# Patient Record
Sex: MALE | Race: ASIAN | NOT HISPANIC OR LATINO
[De-identification: names, ages, dates, MRNs, and addresses within clinical notes are randomized per-mention and may not be internally consistent; named-entity substitution may affect disease eponyms.]

---

## 2018-09-28 ENCOUNTER — RECORD ABSTRACTING (OUTPATIENT)
Age: 78
End: 2018-09-28

## 2018-09-28 DIAGNOSIS — L57.0 ACTINIC KERATOSIS: ICD-10-CM

## 2018-09-28 DIAGNOSIS — M94.9 DISORDER OF BONE, UNSPECIFIED: ICD-10-CM

## 2018-09-28 DIAGNOSIS — Z80.3 FAMILY HISTORY OF MALIGNANT NEOPLASM OF BREAST: ICD-10-CM

## 2018-09-28 DIAGNOSIS — Z87.11 PERSONAL HISTORY OF PEPTIC ULCER DISEASE: ICD-10-CM

## 2018-09-28 DIAGNOSIS — M89.9 DISORDER OF BONE, UNSPECIFIED: ICD-10-CM

## 2018-12-14 ENCOUNTER — LABORATORY RESULT (OUTPATIENT)
Age: 78
End: 2018-12-14

## 2018-12-14 ENCOUNTER — NON-APPOINTMENT (OUTPATIENT)
Age: 78
End: 2018-12-14

## 2018-12-14 ENCOUNTER — TRANSCRIPTION ENCOUNTER (OUTPATIENT)
Age: 78
End: 2018-12-14

## 2018-12-14 ENCOUNTER — APPOINTMENT (OUTPATIENT)
Dept: PULMONOLOGY | Facility: CLINIC | Age: 78
End: 2018-12-14
Payer: MEDICARE

## 2018-12-14 VITALS
SYSTOLIC BLOOD PRESSURE: 165 MMHG | OXYGEN SATURATION: 97 % | HEIGHT: 67 IN | RESPIRATION RATE: 16 BRPM | DIASTOLIC BLOOD PRESSURE: 87 MMHG | TEMPERATURE: 98 F | WEIGHT: 163 LBS | HEART RATE: 75 BPM | BODY MASS INDEX: 25.58 KG/M2

## 2018-12-14 VITALS — DIASTOLIC BLOOD PRESSURE: 86 MMHG | SYSTOLIC BLOOD PRESSURE: 138 MMHG

## 2018-12-14 DIAGNOSIS — Z11.59 ENCOUNTER FOR SCREENING FOR OTHER VIRAL DISEASES: ICD-10-CM

## 2018-12-14 LAB
ALBUMIN: 80
BILIRUB UR QL STRIP: NEGATIVE
CLARITY UR: CLEAR
COLLECTION METHOD: NORMAL
CREATININE: 200
GLUCOSE UR-MCNC: NEGATIVE
HCG UR QL: 0.2 EU/DL
HGB UR QL STRIP.AUTO: NEGATIVE
KETONES UR-MCNC: NEGATIVE
LEUKOCYTE ESTERASE UR QL STRIP: NEGATIVE
MICROALBUMIN/CREAT UR TEST STR-RTO: NORMAL
NITRITE UR QL STRIP: NEGATIVE
PH UR STRIP: 7
PROT UR STRIP-MCNC: 30
SP GR UR STRIP: 1.02

## 2018-12-14 PROCEDURE — 82044 UR ALBUMIN SEMIQUANTITATIVE: CPT | Mod: QW

## 2018-12-14 PROCEDURE — 81002 URINALYSIS NONAUTO W/O SCOPE: CPT | Mod: 59

## 2018-12-14 PROCEDURE — 36415 COLL VENOUS BLD VENIPUNCTURE: CPT

## 2018-12-14 PROCEDURE — 82570 ASSAY OF URINE CREATININE: CPT | Mod: QW

## 2018-12-14 PROCEDURE — 99214 OFFICE O/P EST MOD 30 MIN: CPT | Mod: 25

## 2018-12-14 PROCEDURE — 93000 ELECTROCARDIOGRAM COMPLETE: CPT

## 2018-12-14 RX ORDER — OMEPRAZOLE 40 MG/1
40 CAPSULE, DELAYED RELEASE ORAL
Refills: 0 | Status: DISCONTINUED | COMMUNITY
End: 2018-12-14

## 2018-12-14 RX ORDER — ALBUTEROL SULFATE 108 UG/1
108 (90 BASE) AEROSOL, METERED RESPIRATORY (INHALATION)
Refills: 0 | Status: DISCONTINUED | COMMUNITY
End: 2018-12-14

## 2018-12-14 RX ORDER — MELOXICAM 15 MG/1
15 TABLET ORAL DAILY
Refills: 0 | Status: DISCONTINUED | COMMUNITY
End: 2018-12-14

## 2018-12-17 LAB
25(OH)D3 SERPL-MCNC: 32.4 NG/ML
ALBUMIN SERPL ELPH-MCNC: 4.6 G/DL
ALP BLD-CCNC: 89 U/L
ALT SERPL-CCNC: 18 U/L
ANION GAP SERPL CALC-SCNC: 12 MMOL/L
AST SERPL-CCNC: 13 U/L
BASOPHILS # BLD AUTO: 0.02 K/UL
BASOPHILS NFR BLD AUTO: 0.5 %
BILIRUB DIRECT SERPL-MCNC: 0.2 MG/DL
BILIRUB INDIRECT SERPL-MCNC: 0.6 MG/DL
BILIRUB SERPL-MCNC: 0.8 MG/DL
BUN SERPL-MCNC: 14 MG/DL
CALCIUM SERPL-MCNC: 9.2 MG/DL
CHLORIDE SERPL-SCNC: 103 MMOL/L
CHOLEST SERPL-MCNC: 177 MG/DL
CHOLEST/HDLC SERPL: 3.1 RATIO
CO2 SERPL-SCNC: 26 MMOL/L
CREAT SERPL-MCNC: 1.12 MG/DL
EOSINOPHIL # BLD AUTO: 0.21 K/UL
EOSINOPHIL NFR BLD AUTO: 5.5 %
GLUCOSE SERPL-MCNC: 95 MG/DL
HBA1C MFR BLD HPLC: 5.4 %
HCT VFR BLD CALC: 42.2 %
HCV AB SER QL: NONREACTIVE
HCV S/CO RATIO: 0.08 S/CO
HDLC SERPL-MCNC: 57 MG/DL
HGB BLD-MCNC: 14.2 G/DL
IMM GRANULOCYTES NFR BLD AUTO: 0.3 %
LDLC SERPL CALC-MCNC: 101 MG/DL
LYMPHOCYTES # BLD AUTO: 1.07 K/UL
LYMPHOCYTES NFR BLD AUTO: 28.2 %
MAN DIFF?: NORMAL
MCHC RBC-ENTMCNC: 28.7 PG
MCHC RBC-ENTMCNC: 33.6 GM/DL
MCV RBC AUTO: 85.3 FL
MONOCYTES # BLD AUTO: 0.18 K/UL
MONOCYTES NFR BLD AUTO: 4.7 %
NEUTROPHILS # BLD AUTO: 2.3 K/UL
NEUTROPHILS NFR BLD AUTO: 60.8 %
PLATELET # BLD AUTO: 211 K/UL
POTASSIUM SERPL-SCNC: 4.7 MMOL/L
PROT SERPL-MCNC: 7.3 G/DL
PSA SERPL-MCNC: 4.45 NG/ML
RBC # BLD: 4.95 M/UL
RBC # FLD: 13.8 %
SODIUM SERPL-SCNC: 141 MMOL/L
T3 SERPL-MCNC: 145 NG/DL
T3RU NFR SERPL: 0.98 INDEX
T4 FREE SERPL-MCNC: 1.5 NG/DL
T4 SERPL-MCNC: 8.9 UG/DL
TRIGL SERPL-MCNC: 94 MG/DL
TSH SERPL-ACNC: 1.4 UIU/ML
WBC # FLD AUTO: 3.79 K/UL

## 2018-12-27 ENCOUNTER — APPOINTMENT (OUTPATIENT)
Dept: CV DIAGNOSITCS | Facility: HOSPITAL | Age: 78
End: 2018-12-27
Payer: MEDICARE

## 2018-12-27 ENCOUNTER — OUTPATIENT (OUTPATIENT)
Dept: OUTPATIENT SERVICES | Facility: HOSPITAL | Age: 78
LOS: 1 days | End: 2018-12-27

## 2018-12-27 DIAGNOSIS — J45.909 UNSPECIFIED ASTHMA, UNCOMPLICATED: ICD-10-CM

## 2018-12-27 PROCEDURE — 93306 TTE W/DOPPLER COMPLETE: CPT | Mod: 26

## 2019-01-07 DIAGNOSIS — R19.5 OTHER FECAL ABNORMALITIES: ICD-10-CM

## 2019-01-16 PROBLEM — R19.5 POSITIVE COLORECTAL CANCER SCREENING USING COLOGUARD TEST: Status: ACTIVE | Noted: 2019-01-16

## 2019-01-28 ENCOUNTER — MEDICATION RENEWAL (OUTPATIENT)
Age: 79
End: 2019-01-28

## 2019-01-28 RX ORDER — DILTIAZEM HYDROCHLORIDE 180 MG/1
180 CAPSULE, EXTENDED RELEASE ORAL
Qty: 30 | Refills: 5 | Status: DISCONTINUED | COMMUNITY
Start: 2019-01-22 | End: 2019-01-28

## 2019-02-26 ENCOUNTER — APPOINTMENT (OUTPATIENT)
Dept: PULMONOLOGY | Facility: CLINIC | Age: 79
End: 2019-02-26
Payer: MEDICARE

## 2019-02-26 VITALS
BODY MASS INDEX: 25.58 KG/M2 | WEIGHT: 163 LBS | HEIGHT: 67 IN | SYSTOLIC BLOOD PRESSURE: 138 MMHG | OXYGEN SATURATION: 98 % | DIASTOLIC BLOOD PRESSURE: 85 MMHG | HEART RATE: 65 BPM

## 2019-02-26 PROCEDURE — 99213 OFFICE O/P EST LOW 20 MIN: CPT

## 2019-02-26 NOTE — PHYSICAL EXAM
[General Appearance - Well Developed] : well developed [General Appearance - Well Nourished] : well nourished [Jugular Venous Distention Increased] : there was no jugular-venous distention [Thyroid Diffuse Enlargement] : the thyroid was not enlarged [Thyroid Nodule] : there were no palpable thyroid nodules [Heart Sounds] : normal S1 and S2 [Murmurs] : no murmurs present [Edema] : no peripheral edema present [Auscultation Breath Sounds / Voice Sounds] : lungs were clear to auscultation bilaterally [Lungs Percussion] : the lungs were normal to percussion [Abdomen Soft] : soft [Abdomen Tenderness] : non-tender [] : no hepato-splenomegaly [Abdomen Mass (___ Cm)] : no abdominal mass palpated [Abnormal Walk] : normal gait [Gait - Sufficient For Exercise Testing] : the gait was sufficient for exercise testing [Nail Clubbing] : no clubbing of the fingernails [Cyanosis, Localized] : no localized cyanosis [Deep Tendon Reflexes (DTR)] : deep tendon reflexes were 2+ and symmetric [Sensation] : the sensory exam was normal to light touch and pinprick [No Focal Deficits] : no focal deficits [Oriented To Time, Place, And Person] : oriented to person, place, and time [Impaired Insight] : insight and judgment were intact [Affect] : the affect was normal [FreeTextEntry1] : Rectal neg.

## 2019-03-02 ENCOUNTER — TRANSCRIPTION ENCOUNTER (OUTPATIENT)
Age: 79
End: 2019-03-02

## 2019-03-18 ENCOUNTER — RX RENEWAL (OUTPATIENT)
Age: 79
End: 2019-03-18

## 2019-06-11 ENCOUNTER — APPOINTMENT (OUTPATIENT)
Dept: PULMONOLOGY | Facility: CLINIC | Age: 79
End: 2019-06-11
Payer: MEDICARE

## 2019-06-11 VITALS
TEMPERATURE: 100.2 F | SYSTOLIC BLOOD PRESSURE: 114 MMHG | HEART RATE: 95 BPM | OXYGEN SATURATION: 95 % | DIASTOLIC BLOOD PRESSURE: 71 MMHG

## 2019-06-11 DIAGNOSIS — I51.7 CARDIOMEGALY: ICD-10-CM

## 2019-06-11 DIAGNOSIS — M54.9 DORSALGIA, UNSPECIFIED: ICD-10-CM

## 2019-06-11 DIAGNOSIS — R50.9 FEVER, UNSPECIFIED: ICD-10-CM

## 2019-06-11 LAB
BILIRUB UR QL STRIP: NEGATIVE
CLARITY UR: CLEAR
COLLECTION METHOD: NORMAL
GLUCOSE UR-MCNC: NEGATIVE
HCG UR QL: 0.2 EU/DL
HGB UR QL STRIP.AUTO: NEGATIVE
KETONES UR-MCNC: NEGATIVE
LEUKOCYTE ESTERASE UR QL STRIP: NEGATIVE
NITRITE UR QL STRIP: NEGATIVE
PH UR STRIP: 5.5
PROT UR STRIP-MCNC: NEGATIVE
SP GR UR STRIP: 1.02

## 2019-06-11 PROCEDURE — 36415 COLL VENOUS BLD VENIPUNCTURE: CPT

## 2019-06-11 PROCEDURE — 81003 URINALYSIS AUTO W/O SCOPE: CPT | Mod: QW

## 2019-06-11 PROCEDURE — 99213 OFFICE O/P EST LOW 20 MIN: CPT | Mod: 25

## 2019-06-11 RX ORDER — IRBESARTAN 150 MG/1
150 TABLET ORAL
Qty: 90 | Refills: 2 | Status: DISCONTINUED | COMMUNITY
Start: 2019-01-28 | End: 2019-06-11

## 2019-06-11 RX ORDER — SODIUM SULFATE, POTASSIUM SULFATE, MAGNESIUM SULFATE 17.5; 3.13; 1.6 G/ML; G/ML; G/ML
17.5-3.13-1.6 SOLUTION, CONCENTRATE ORAL
Qty: 354 | Refills: 0 | Status: DISCONTINUED | COMMUNITY
Start: 2019-03-26

## 2019-06-11 NOTE — REASON FOR VISIT
CONSTITUTIONAL: No fever, weight loss, or fatigue  EYES: No eye pain, visual disturbances, or discharge  ENMT:  No difficulty hearing, tinnitus, vertigo; No sinus or throat pain  NECK: No pain or stiffness  BREASTS: No pain, masses, or nipple discharge  RESPIRATORY: No cough, wheezing, chills or hemoptysis; No shortness of breath  CARDIOVASCULAR: No chest pain, palpitations, dizziness, or leg swelling  GASTROINTESTINAL: No abdominal or epigastric pain. No nausea, vomiting, or hematemesis; No diarrhea or constipation. No melena or hematochezia.  GENITOURINARY: No dysuria, frequency, hematuria, or incontinence  NEUROLOGICAL: No headaches, memory loss, loss of strength, numbness, or tremors  SKIN: No itching, burning, rashes, or lesions   LYMPH NODES: No enlarged glands  ENDOCRINE: No heat or cold intolerance; No hair loss  MUSCULOSKELETAL: left leg swelling; No muscle, back, or extremity pain  PSYCHIATRIC: No depression, anxiety, mood swings, or difficulty sleeping  HEME/LYMPH: No easy bruising, or bleeding gums  ALLERGY AND IMMUNOLOGIC: No hives or eczema [Follow-Up] : a follow-up visit [FreeTextEntry2] : hypertension

## 2019-06-11 NOTE — PHYSICAL EXAM
[General Appearance - Well Developed] : well developed [General Appearance - Well Nourished] : well nourished [Jugular Venous Distention Increased] : there was no jugular-venous distention [Thyroid Diffuse Enlargement] : the thyroid was not enlarged [Thyroid Nodule] : there were no palpable thyroid nodules [Heart Sounds] : normal S1 and S2 [Edema] : no peripheral edema present [Murmurs] : no murmurs present [Auscultation Breath Sounds / Voice Sounds] : lungs were clear to auscultation bilaterally [Lungs Percussion] : the lungs were normal to percussion [Abdomen Soft] : soft [Abdomen Tenderness] : non-tender [] : no hepato-splenomegaly [Abdomen Mass (___ Cm)] : no abdominal mass palpated [Abnormal Walk] : normal gait [Gait - Sufficient For Exercise Testing] : the gait was sufficient for exercise testing [Nail Clubbing] : no clubbing of the fingernails [Cyanosis, Localized] : no localized cyanosis [Deep Tendon Reflexes (DTR)] : deep tendon reflexes were 2+ and symmetric [Sensation] : the sensory exam was normal to light touch and pinprick [No Focal Deficits] : no focal deficits [Oriented To Time, Place, And Person] : oriented to person, place, and time [Impaired Insight] : insight and judgment were intact [Affect] : the affect was normal [FreeTextEntry1] : Rectal neg.

## 2019-06-11 NOTE — HISTORY OF PRESENT ILLNESS
[FreeTextEntry1] : Had recent colonoscopy because of positive cologard, it was clear no polyps \par \par past day has not felt well and has some minor back discomfort

## 2019-06-12 LAB
BASOPHILS # BLD AUTO: 0.02 K/UL
BASOPHILS NFR BLD AUTO: 0.4 %
EOSINOPHIL # BLD AUTO: 0.05 K/UL
EOSINOPHIL NFR BLD AUTO: 1 %
HCT VFR BLD CALC: 44.9 %
HGB BLD-MCNC: 14.9 G/DL
IMM GRANULOCYTES NFR BLD AUTO: 0.4 %
LYMPHOCYTES # BLD AUTO: 0.23 K/UL
LYMPHOCYTES NFR BLD AUTO: 4.4 %
MAN DIFF?: NORMAL
MCHC RBC-ENTMCNC: 29.7 PG
MCHC RBC-ENTMCNC: 33.2 GM/DL
MCV RBC AUTO: 89.4 FL
MONOCYTES # BLD AUTO: 0.22 K/UL
MONOCYTES NFR BLD AUTO: 4.2 %
NEUTROPHILS # BLD AUTO: 4.72 K/UL
NEUTROPHILS NFR BLD AUTO: 89.6 %
PLATELET # BLD AUTO: 173 K/UL
RBC # BLD: 5.02 M/UL
RBC # FLD: 13.5 %
WBC # FLD AUTO: 5.26 K/UL

## 2019-10-08 ENCOUNTER — APPOINTMENT (OUTPATIENT)
Dept: PULMONOLOGY | Facility: CLINIC | Age: 79
End: 2019-10-08

## 2020-04-17 ENCOUNTER — APPOINTMENT (OUTPATIENT)
Dept: PULMONOLOGY | Facility: CLINIC | Age: 80
End: 2020-04-17

## 2020-04-20 ENCOUNTER — TRANSCRIPTION ENCOUNTER (OUTPATIENT)
Age: 80
End: 2020-04-20

## 2020-05-22 ENCOUNTER — APPOINTMENT (OUTPATIENT)
Dept: PULMONOLOGY | Facility: CLINIC | Age: 80
End: 2020-05-22
Payer: MEDICARE

## 2020-05-22 VITALS
SYSTOLIC BLOOD PRESSURE: 151 MMHG | DIASTOLIC BLOOD PRESSURE: 82 MMHG | RESPIRATION RATE: 16 BRPM | HEART RATE: 80 BPM | OXYGEN SATURATION: 96 % | TEMPERATURE: 98.6 F

## 2020-05-22 DIAGNOSIS — M79.89 OTHER SPECIFIED SOFT TISSUE DISORDERS: ICD-10-CM

## 2020-05-22 PROCEDURE — 99213 OFFICE O/P EST LOW 20 MIN: CPT | Mod: 25

## 2020-05-22 PROCEDURE — 36415 COLL VENOUS BLD VENIPUNCTURE: CPT

## 2020-05-23 NOTE — HISTORY OF PRESENT ILLNESS
[TextBox_4] : Patient with 1 day history of some swelling behind the left knee.  Believes leg may be swollen but has improved.  No definitive trauma.  No significant travel.  No edema.

## 2020-05-23 NOTE — PHYSICAL EXAM
[No Acute Distress] : no acute distress [Normal S1, S2] : normal s1, s2 [Normal Oropharynx] : normal oropharynx [Clear to Auscultation Bilaterally] : clear to auscultation bilaterally [Benign] : benign [Normal to Percussion] : normal to percussion [No Clubbing] : no clubbing [No Edema] : no edema [No Cyanosis] : no cyanosis [TextBox_105] : Mild swelling of left knee.  Mild swelling behind left knee.

## 2020-05-26 ENCOUNTER — TRANSCRIPTION ENCOUNTER (OUTPATIENT)
Age: 80
End: 2020-05-26

## 2020-05-26 LAB — DEPRECATED D DIMER PPP IA-ACNC: <150 NG/ML DDU

## 2020-06-05 ENCOUNTER — TRANSCRIPTION ENCOUNTER (OUTPATIENT)
Age: 80
End: 2020-06-05

## 2020-06-08 ENCOUNTER — APPOINTMENT (OUTPATIENT)
Dept: ORTHOPEDIC SURGERY | Facility: CLINIC | Age: 80
End: 2020-06-08
Payer: MEDICARE

## 2020-06-08 DIAGNOSIS — M47.816 SPONDYLOSIS W/OUT MYELOPATHY OR RADICULOPATHY, LUMBAR REGION: ICD-10-CM

## 2020-06-08 DIAGNOSIS — M17.10 UNILATERAL PRIMARY OSTEOARTHRITIS, UNSPECIFIED KNEE: ICD-10-CM

## 2020-06-08 DIAGNOSIS — M48.061 SPINAL STENOSIS, LUMBAR REGION WITHOUT NEUROGENIC CLAUDICATION: ICD-10-CM

## 2020-06-08 PROCEDURE — 73562 X-RAY EXAM OF KNEE 3: CPT | Mod: 50

## 2020-06-08 PROCEDURE — 72100 X-RAY EXAM L-S SPINE 2/3 VWS: CPT

## 2020-06-08 PROCEDURE — 99214 OFFICE O/P EST MOD 30 MIN: CPT

## 2020-12-04 ENCOUNTER — TRANSCRIPTION ENCOUNTER (OUTPATIENT)
Age: 80
End: 2020-12-04

## 2021-05-11 ENCOUNTER — TRANSCRIPTION ENCOUNTER (OUTPATIENT)
Age: 81
End: 2021-05-11

## 2021-05-17 ENCOUNTER — RX RENEWAL (OUTPATIENT)
Age: 81
End: 2021-05-17

## 2021-05-17 ENCOUNTER — TRANSCRIPTION ENCOUNTER (OUTPATIENT)
Age: 81
End: 2021-05-17

## 2021-07-11 ENCOUNTER — NON-APPOINTMENT (OUTPATIENT)
Age: 81
End: 2021-07-11

## 2021-07-16 ENCOUNTER — NON-APPOINTMENT (OUTPATIENT)
Age: 81
End: 2021-07-16

## 2021-07-16 ENCOUNTER — LABORATORY RESULT (OUTPATIENT)
Age: 81
End: 2021-07-16

## 2021-07-16 ENCOUNTER — APPOINTMENT (OUTPATIENT)
Dept: PULMONOLOGY | Facility: CLINIC | Age: 81
End: 2021-07-16
Payer: MEDICARE

## 2021-07-16 VITALS
SYSTOLIC BLOOD PRESSURE: 143 MMHG | OXYGEN SATURATION: 96 % | TEMPERATURE: 97.6 F | DIASTOLIC BLOOD PRESSURE: 78 MMHG | HEART RATE: 80 BPM

## 2021-07-16 DIAGNOSIS — I06.0 RHEUMATIC AORTIC STENOSIS: ICD-10-CM

## 2021-07-16 DIAGNOSIS — Z23 ENCOUNTER FOR IMMUNIZATION: ICD-10-CM

## 2021-07-16 LAB
ALBUMIN: 150
BILIRUB UR QL STRIP: NORMAL
CLARITY UR: CLEAR
COLLECTION METHOD: NORMAL
CREATININE: 200
GLUCOSE UR-MCNC: NORMAL
HCG UR QL: 0.2 EU/DL
HGB UR QL STRIP.AUTO: NORMAL
KETONES UR-MCNC: NORMAL
LEUKOCYTE ESTERASE UR QL STRIP: NORMAL
MICROALBUMIN/CREAT UR TEST STR-RTO: NORMAL
NITRITE UR QL STRIP: NORMAL
PH UR STRIP: 6.5
PROT UR STRIP-MCNC: 100
SP GR UR STRIP: 1.02

## 2021-07-16 PROCEDURE — 36415 COLL VENOUS BLD VENIPUNCTURE: CPT

## 2021-07-16 PROCEDURE — 81003 URINALYSIS AUTO W/O SCOPE: CPT | Mod: QW

## 2021-07-16 PROCEDURE — 82044 UR ALBUMIN SEMIQUANTITATIVE: CPT | Mod: QW

## 2021-07-16 PROCEDURE — 99214 OFFICE O/P EST MOD 30 MIN: CPT | Mod: 25

## 2021-07-16 PROCEDURE — 93000 ELECTROCARDIOGRAM COMPLETE: CPT

## 2021-07-16 NOTE — PROCEDURE
[FreeTextEntry1] : Electrocardiogram reveals normal sinus rhythm without significant change from prior.

## 2021-07-16 NOTE — ASSESSMENT
[FreeTextEntry1] : Medications reviewed and renewed.\par Labs drawn in office today\par Trial of Mycostatin powder.  If no response dermatology evaluation.

## 2021-07-16 NOTE — PHYSICAL EXAM
[No Focal Deficits] : no focal deficits [Oriented x3] : oriented x3 [No Acute Distress] : no acute distress [Supple] : supple [No JVD] : no jvd [Normal S1, S2] : normal s1, s2 [No Murmurs] : no murmurs [Clear to Auscultation Bilaterally] : clear to auscultation bilaterally [Normal to Percussion] : normal to percussion [No Abnormalities] : no abnormalities [Benign] : benign [Not Tender] : not tender [No HSM] : no hsm [No Clubbing] : no clubbing [No Cyanosis] : no cyanosis [No Edema] : no edema [TextBox_89] : Perianal rash. [TextBox_125] : Perianal rash

## 2021-07-16 NOTE — DISCUSSION/SUMMARY
[FreeTextEntry1] : Blood pressure controlled on present regimen.\par Recommendations include continuing present medications.\par Low-salt diet\par Exercise\par Renal rash most likely fungal.\par Abnormal EKG without change.  Work-up in past.\par

## 2021-07-16 NOTE — HISTORY OF PRESENT ILLNESS
[TextBox_4] : Colonoscopy  Y\par Optho  Y\par Derm Y\par \par \par Feeling well\par Does have some perianal pruritis.  Tried cortisone cream without response.\par \par No significant cough, wheezing, chest pain or SOB.\par

## 2021-07-19 LAB
25(OH)D3 SERPL-MCNC: 43.3 NG/ML
ALBUMIN SERPL ELPH-MCNC: 4.7 G/DL
ALP BLD-CCNC: 89 U/L
ALT SERPL-CCNC: 10 U/L
ANION GAP SERPL CALC-SCNC: 13 MMOL/L
AST SERPL-CCNC: 13 U/L
BASOPHILS # BLD AUTO: 0.03 K/UL
BASOPHILS NFR BLD AUTO: 0.8 %
BILIRUB DIRECT SERPL-MCNC: 0.2 MG/DL
BILIRUB INDIRECT SERPL-MCNC: 0.6 MG/DL
BILIRUB SERPL-MCNC: 0.8 MG/DL
BUN SERPL-MCNC: 18 MG/DL
CALCIUM SERPL-MCNC: 9.9 MG/DL
CHLORIDE SERPL-SCNC: 103 MMOL/L
CHOLEST SERPL-MCNC: 176 MG/DL
CO2 SERPL-SCNC: 25 MMOL/L
COVID-19 NUCLEOCAPSID  GAM ANTIBODY INTERPRETATION: NEGATIVE
CREAT SERPL-MCNC: 1.27 MG/DL
EOSINOPHIL # BLD AUTO: 0.25 K/UL
EOSINOPHIL NFR BLD AUTO: 6.4 %
ESTIMATED AVERAGE GLUCOSE: 108 MG/DL
GLUCOSE SERPL-MCNC: 95 MG/DL
HBA1C MFR BLD HPLC: 5.4 %
HCT VFR BLD CALC: 44 %
HDLC SERPL-MCNC: 56 MG/DL
HGB BLD-MCNC: 14.5 G/DL
IMM GRANULOCYTES NFR BLD AUTO: 0.3 %
LDLC SERPL CALC-MCNC: 101 MG/DL
LYMPHOCYTES # BLD AUTO: 1.09 K/UL
LYMPHOCYTES NFR BLD AUTO: 27.7 %
MAN DIFF?: NORMAL
MCHC RBC-ENTMCNC: 30.1 PG
MCHC RBC-ENTMCNC: 33 GM/DL
MCV RBC AUTO: 91.5 FL
MONOCYTES # BLD AUTO: 0.35 K/UL
MONOCYTES NFR BLD AUTO: 8.9 %
NEUTROPHILS # BLD AUTO: 2.2 K/UL
NEUTROPHILS NFR BLD AUTO: 55.9 %
NONHDLC SERPL-MCNC: 120 MG/DL
PLATELET # BLD AUTO: 184 K/UL
POTASSIUM SERPL-SCNC: 5 MMOL/L
PROT SERPL-MCNC: 7.2 G/DL
RBC # BLD: 4.81 M/UL
RBC # FLD: 13.6 %
SARS-COV-2 AB SERPL QL IA: 0.1 INDEX
SODIUM SERPL-SCNC: 142 MMOL/L
T3 SERPL-MCNC: 120 NG/DL
T3RU NFR SERPL: 0.9 TBI
T4 FREE SERPL-MCNC: 1.3 NG/DL
T4 SERPL-MCNC: 7.7 UG/DL
TRIGL SERPL-MCNC: 97 MG/DL
TSH SERPL-ACNC: 1.05 UIU/ML
WBC # FLD AUTO: 3.93 K/UL

## 2021-10-22 ENCOUNTER — APPOINTMENT (OUTPATIENT)
Dept: PULMONOLOGY | Facility: CLINIC | Age: 81
End: 2021-10-22

## 2021-11-21 ENCOUNTER — RX RENEWAL (OUTPATIENT)
Age: 81
End: 2021-11-21

## 2022-05-09 ENCOUNTER — RX RENEWAL (OUTPATIENT)
Age: 82
End: 2022-05-09

## 2022-09-09 ENCOUNTER — NON-APPOINTMENT (OUTPATIENT)
Age: 82
End: 2022-09-09

## 2022-09-27 ENCOUNTER — LABORATORY RESULT (OUTPATIENT)
Age: 82
End: 2022-09-27

## 2022-09-27 ENCOUNTER — NON-APPOINTMENT (OUTPATIENT)
Age: 82
End: 2022-09-27

## 2022-09-27 ENCOUNTER — APPOINTMENT (OUTPATIENT)
Dept: PULMONOLOGY | Facility: CLINIC | Age: 82
End: 2022-09-27

## 2022-09-27 VITALS
WEIGHT: 155 LBS | DIASTOLIC BLOOD PRESSURE: 76 MMHG | HEART RATE: 83 BPM | SYSTOLIC BLOOD PRESSURE: 133 MMHG | BODY MASS INDEX: 24.28 KG/M2 | OXYGEN SATURATION: 96 %

## 2022-09-27 DIAGNOSIS — I51.9 HEART DISEASE, UNSPECIFIED: ICD-10-CM

## 2022-09-27 DIAGNOSIS — Z00.00 ENCOUNTER FOR GENERAL ADULT MEDICAL EXAMINATION W/OUT ABNORMAL FINDINGS: ICD-10-CM

## 2022-09-27 DIAGNOSIS — R79.89 OTHER SPECIFIED ABNORMAL FINDINGS OF BLOOD CHEMISTRY: ICD-10-CM

## 2022-09-27 DIAGNOSIS — H26.9 UNSPECIFIED CATARACT: ICD-10-CM

## 2022-09-27 DIAGNOSIS — J45.909 UNSPECIFIED ASTHMA, UNCOMPLICATED: ICD-10-CM

## 2022-09-27 DIAGNOSIS — R97.20 ELEVATED PROSTATE, SPECIFIC ANTIGEN [PSA]: ICD-10-CM

## 2022-09-27 LAB
ALBUMIN: NORMAL
BILIRUB UR QL STRIP: NORMAL
CLARITY UR: CLEAR
COLLECTION METHOD: NORMAL
CREATININE: NORMAL
GLUCOSE UR-MCNC: NORMAL
HCG UR QL: 0.2 EU/DL
HGB UR QL STRIP.AUTO: NORMAL
KETONES UR-MCNC: NORMAL
LEUKOCYTE ESTERASE UR QL STRIP: NORMAL
MICROALBUMIN/CREAT UR TEST STR-RTO: NORMAL
NITRITE UR QL STRIP: NORMAL
PH UR STRIP: 5.5
PROT UR STRIP-MCNC: NORMAL
SP GR UR STRIP: 1.02

## 2022-09-27 PROCEDURE — 99214 OFFICE O/P EST MOD 30 MIN: CPT | Mod: 25

## 2022-09-27 PROCEDURE — 81003 URINALYSIS AUTO W/O SCOPE: CPT | Mod: QW

## 2022-09-27 PROCEDURE — 82044 UR ALBUMIN SEMIQUANTITATIVE: CPT | Mod: QW

## 2022-09-27 PROCEDURE — 93000 ELECTROCARDIOGRAM COMPLETE: CPT

## 2022-09-27 PROCEDURE — 36415 COLL VENOUS BLD VENIPUNCTURE: CPT

## 2022-09-27 RX ORDER — NYSTATIN 100000 1/G
100000 POWDER TOPICAL
Qty: 1 | Refills: 3 | Status: DISCONTINUED | COMMUNITY
Start: 2021-07-16 | End: 2022-09-27

## 2022-09-29 ENCOUNTER — TRANSCRIPTION ENCOUNTER (OUTPATIENT)
Age: 82
End: 2022-09-29

## 2022-09-29 NOTE — ASSESSMENT
[FreeTextEntry1] : Cardiology follow-up.\par Continues therapy.\par Labs sent.\par Consider neurology evaluation if leg weakness persist.\par \par Medical problems as above. Medical status maximized. No medical contraindications to surgery.\par

## 2022-09-29 NOTE — PHYSICAL EXAM
[No Acute Distress] : no acute distress [Supple] : supple [No JVD] : no jvd [Normal S1, S2] : normal s1, s2 [Clear to Auscultation Bilaterally] : clear to auscultation bilaterally [Normal to Percussion] : normal to percussion [No Abnormalities] : no abnormalities [Benign] : benign [Not Tender] : not tender [No HSM] : no hsm [No Clubbing] : no clubbing [No Cyanosis] : no cyanosis [No Edema] : no edema [No Focal Deficits] : no focal deficits [Oriented x3] : oriented x3 [Murmur ___ / 6] : murmur [unfilled] / 6 [No Sensory Deficits] : no sensory deficits [No Motor Deficits] : no motor deficits [TextBox_89] : Perianal rash. [TextBox_125] : Perianal rash

## 2022-09-29 NOTE — HISTORY OF PRESENT ILLNESS
[TextBox_4] : \par Going for left eye cataract with Dr Lana Long Fax [phone]\par \par Colonoscopy  Y 4 years ago\par Optho  Y\par Derm Y 2 years ago\par \par \par Feeling well\par Got COVID booster last week wants to wait on flu shot\par has not seen cardio in years\par \par No significant cough, wheezing, chest pain or SOB.\par Feels legs are getting weaker. \par

## 2022-09-29 NOTE — DISCUSSION/SUMMARY
[FreeTextEntry1] : Blood pressure controlled on present regimen.\par Recommendations include continuing present medications.\par Low-salt diet\par Exercise\par Abnormal EKG without change.  Work-up in past.\par Mild leg weakness. Normal exam

## 2022-09-30 LAB
25(OH)D3 SERPL-MCNC: 45.3 NG/ML
ANION GAP SERPL CALC-SCNC: 14 MMOL/L
BASOPHILS # BLD AUTO: 0.04 K/UL
BASOPHILS NFR BLD AUTO: 1 %
BUN SERPL-MCNC: 21 MG/DL
CALCIUM SERPL-MCNC: 9.7 MG/DL
CHLORIDE SERPL-SCNC: 103 MMOL/L
CHOLEST SERPL-MCNC: 192 MG/DL
CO2 SERPL-SCNC: 24 MMOL/L
CREAT SERPL-MCNC: 1.29 MG/DL
EGFR: 55 ML/MIN/1.73M2
EOSINOPHIL # BLD AUTO: 0.31 K/UL
EOSINOPHIL NFR BLD AUTO: 7.8 %
ESTIMATED AVERAGE GLUCOSE: 111 MG/DL
GLUCOSE SERPL-MCNC: 94 MG/DL
HBA1C MFR BLD HPLC: 5.5 %
HCT VFR BLD CALC: 43.3 %
HCV AB SER QL: NONREACTIVE
HCV S/CO RATIO: 0.13 S/CO
HDLC SERPL-MCNC: 61 MG/DL
HGB BLD-MCNC: 14.2 G/DL
IMM GRANULOCYTES NFR BLD AUTO: 0.3 %
LDLC SERPL CALC-MCNC: 114 MG/DL
LYMPHOCYTES # BLD AUTO: 1.06 K/UL
LYMPHOCYTES NFR BLD AUTO: 26.5 %
MAN DIFF?: NORMAL
MCHC RBC-ENTMCNC: 29.7 PG
MCHC RBC-ENTMCNC: 32.8 GM/DL
MCV RBC AUTO: 90.6 FL
MONOCYTES # BLD AUTO: 0.34 K/UL
MONOCYTES NFR BLD AUTO: 8.5 %
NEUTROPHILS # BLD AUTO: 2.24 K/UL
NEUTROPHILS NFR BLD AUTO: 55.9 %
NONHDLC SERPL-MCNC: 131 MG/DL
PLATELET # BLD AUTO: 201 K/UL
POTASSIUM SERPL-SCNC: 4.3 MMOL/L
RBC # BLD: 4.78 M/UL
RBC # FLD: 14 %
SODIUM SERPL-SCNC: 141 MMOL/L
T3 SERPL-MCNC: 132 NG/DL
T3RU NFR SERPL: 0.9 TBI
T4 FREE SERPL-MCNC: 1.5 NG/DL
T4 SERPL-MCNC: 8.8 UG/DL
TRIGL SERPL-MCNC: 87 MG/DL
TSH SERPL-ACNC: 0.84 UIU/ML
WBC # FLD AUTO: 4 K/UL

## 2022-10-31 ENCOUNTER — TRANSCRIPTION ENCOUNTER (OUTPATIENT)
Age: 82
End: 2022-10-31

## 2022-11-04 ENCOUNTER — APPOINTMENT (OUTPATIENT)
Dept: PULMONOLOGY | Facility: CLINIC | Age: 82
End: 2022-11-04

## 2022-11-04 VITALS — HEART RATE: 100 BPM | SYSTOLIC BLOOD PRESSURE: 153 MMHG | OXYGEN SATURATION: 97 % | DIASTOLIC BLOOD PRESSURE: 77 MMHG

## 2022-11-04 VITALS — DIASTOLIC BLOOD PRESSURE: 74 MMHG | SYSTOLIC BLOOD PRESSURE: 124 MMHG

## 2022-11-04 PROCEDURE — 99213 OFFICE O/P EST LOW 20 MIN: CPT

## 2022-11-04 NOTE — PHYSICAL EXAM
[No Acute Distress] : no acute distress [Supple] : supple [No JVD] : no jvd [Murmur ___ / 6] : murmur [unfilled] / 6 [Normal S1, S2] : normal s1, s2 [Clear to Auscultation Bilaterally] : clear to auscultation bilaterally [Normal to Percussion] : normal to percussion [No Abnormalities] : no abnormalities [Benign] : benign [Not Tender] : not tender [No HSM] : no hsm [No Clubbing] : no clubbing [No Cyanosis] : no cyanosis [No Edema] : no edema [No Focal Deficits] : no focal deficits [No Sensory Deficits] : no sensory deficits [No Motor Deficits] : no motor deficits [Oriented x3] : oriented x3 [TextBox_89] : Perianal rash. [TextBox_125] : Perianal rash

## 2022-11-04 NOTE — DISCUSSION/SUMMARY
[FreeTextEntry1] : Blood pressure controlled on present regimen.\par Recommendations include continuing present medications.\par Low-salt diet\par Exercise\par Abnormal EKG without change.  Work-up in past. Will repeat echo.\par Mild leg weakness. Normal exam

## 2022-11-04 NOTE — HISTORY OF PRESENT ILLNESS
[TextBox_4] : \par Going for left eye cataract with Dr Lana Long Fax [phone]\par \par \par Feeling well\par No significant cough, wheezing, chest pain or SOB.\par Feels legs are getting weaker. \par \par States had cardiac evaluation 3 years ago told fine.

## 2022-11-04 NOTE — PROCEDURE
[FreeTextEntry1] : 9/27/22 \par Electrocardiogram reveals normal sinus rhythm without significant change from prior.\par \par Patient name: LNIDY LOVE\par YOB: 1940   Age: 78 (M)   MR#: [id number]\par Study Date: 12/27/2018\par Location: O/PSonographer: Robin Tripathi\par Study quality: Technically good\par Referring Physician: Armand Bradley MD\par Blood Pressure: 170/99 mmHg\par Height: 170 cm\par Weight: 72 kg\par BSA: 1.8 m2\par ------------------------------------------------------------------------\par PROCEDURE: Transthoracic echocardiogram with 2-D, M-Mode\par and complete spectral and color flow Doppler.\par INDICATION: Abnormal electrocardiogram (ECG) (EKG) (R94.31)\par ------------------------------------------------------------------------\par DIMENSIONS:\par Dimensions:     Normal Values:\par LA:     3.9 cm    2.0 - 4.0 cm\par Ao:     3.6 cm    2.0 - 3.8 cm\par SEPTUM: 1.6 cm    0.6 - 1.2 cm\par PWT:    1.5 cm    0.6 - 1.1 cm\par LVIDd:  5.0 cm    3.0 - 5.6 cm\par LVIDs:  3.2 cm    1.8 - 4.0 cm\par Derived Variables:\par LVMI: 185 g/m2\par RWT: 0.60\par Fractional short: 36 %\par Ejection Fraction (Teicholtz): 65 %\par ------------------------------------------------------------------------\par OBSERVATIONS:\par Mitral Valve: Mitral annular calcification, otherwise\par normal mitral valve. Minimal mitral regurgitation.\par Aortic Root: Normal size aortic root. (Ao:3.6 cm).\par Aortic Valve: Calcified trileaflet aortic valve with normal\par opening.\par Left Atrium: Normal left atrium.  LA volume index = 24\par cc/m2.\par Left Ventricle: Normal left ventricular systolic function.\par No segmental wall motion abnormalities. The distal and\par apical endocardium are particularly hypertrophied. Severe\par concentric left ventricular hypertrophy.\par Right Heart: Normal right atrium. Normal right ventricular\par size and function. The right ventricular endocardium is\par hypertrophied.  Normal tricuspid valve. Mild tricuspid\par regurgitation. Normal pulmonic valve. Minimal pulmonic\par regurgitation.\par Pericardium/PleuraNormal pericardium with no pericardial\par effusion.\par Hemodynamic: Estimated right ventricular systolic pressure\par equals 45 mm Hg, assuming right atrial pressure equals 10\par mm Hg, consistent with mild pulmonary hypertension.\par ------------------------------------------------------------------------\par CONCLUSIONS:\par 1. Calcified trileaflet aortic valve with normal opening.\par 2. Severe concentric left ventricular hypertrophy.\par 3. Normal left ventricular systolic function. No segmental\par wall motion abnormalities. The distal and apical\par endocardium are particularly hypertrophied.\par 4. Normal right ventricular size and function. The right\par ventricular endocardium is hypertrophied. \par 5. Estimated pulmonary artery systolic pressure equals 45\par mm Hg, assuming right atrial pressure equals 10  mm Hg,\par consistent with mild pulmonary hypertension.\par ------------------------------------------------------------------------Michaelpar Confirmed on  12/27/2018 - 11:36:28 by Kalee Murphy,ghassan WAGONER

## 2022-11-04 NOTE — ASSESSMENT
[FreeTextEntry1] : Medications reviewed and renewed.\par For repeat echo.\par \par Medical problems as above. Medical status maximized. No medical contraindications to surgery.\par

## 2022-11-30 ENCOUNTER — APPOINTMENT (OUTPATIENT)
Dept: CARDIOLOGY | Facility: CLINIC | Age: 82
End: 2022-11-30

## 2022-11-30 PROCEDURE — 93306 TTE W/DOPPLER COMPLETE: CPT

## 2022-12-02 ENCOUNTER — APPOINTMENT (OUTPATIENT)
Dept: CARDIOLOGY | Facility: CLINIC | Age: 82
End: 2022-12-02

## 2022-12-02 VITALS
DIASTOLIC BLOOD PRESSURE: 72 MMHG | SYSTOLIC BLOOD PRESSURE: 140 MMHG | OXYGEN SATURATION: 99 % | HEART RATE: 81 BPM | WEIGHT: 164 LBS | BODY MASS INDEX: 25.74 KG/M2 | TEMPERATURE: 98.4 F | HEIGHT: 67 IN

## 2022-12-02 DIAGNOSIS — I51.7 CARDIOMEGALY: ICD-10-CM

## 2022-12-02 DIAGNOSIS — I10 ESSENTIAL (PRIMARY) HYPERTENSION: ICD-10-CM

## 2022-12-02 DIAGNOSIS — I77.810 THORACIC AORTIC ECTASIA: ICD-10-CM

## 2022-12-02 DIAGNOSIS — R93.1 ABNORMAL FINDINGS ON DIAGNOSTIC IMAGING OF HEART AND CORONARY CIRCULATION: ICD-10-CM

## 2022-12-02 DIAGNOSIS — E78.5 HYPERLIPIDEMIA, UNSPECIFIED: ICD-10-CM

## 2022-12-02 DIAGNOSIS — R94.31 ABNORMAL ELECTROCARDIOGRAM [ECG] [EKG]: ICD-10-CM

## 2022-12-02 DIAGNOSIS — D22.9 MELANOCYTIC NEVI, UNSPECIFIED: ICD-10-CM

## 2022-12-02 PROCEDURE — 99204 OFFICE O/P NEW MOD 45 MIN: CPT

## 2022-12-02 NOTE — HISTORY OF PRESENT ILLNESS
[FreeTextEntry1] : This is a 82 year male with a Pmhx of HTN dilated aorta who presents to the office as a new patient for cardiac evaluation. pt reports feeling well Denies any complaints \par pt had echo done in office this week with signs of LVH\par \par Pt denies any CP, SOB, heart palpitations, dizziness, abdominal pain N/V/D fever or chills\par 
Alert and oriented to person, place and time, memory intact, behavior appropriate to situation, PERRL.

## 2022-12-02 NOTE — PHYSICAL EXAM

## 2023-01-07 ENCOUNTER — NON-APPOINTMENT (OUTPATIENT)
Age: 83
End: 2023-01-07

## 2023-01-07 ENCOUNTER — APPOINTMENT (OUTPATIENT)
Dept: CARDIOLOGY | Facility: CLINIC | Age: 83
End: 2023-01-07

## 2023-01-07 LAB
ALBUMIN SERPL ELPH-MCNC: 4.2 G/DL
ALP BLD-CCNC: 95 U/L
ALT SERPL-CCNC: 11 U/L
ANION GAP SERPL CALC-SCNC: 11 MMOL/L
AST SERPL-CCNC: 15 U/L
BILIRUB DIRECT SERPL-MCNC: 0.1 MG/DL
BILIRUB INDIRECT SERPL-MCNC: 0.4 MG/DL
BILIRUB SERPL-MCNC: 0.4 MG/DL
BUN SERPL-MCNC: 16 MG/DL
CALCIUM SERPL-MCNC: 9.5 MG/DL
CHLORIDE SERPL-SCNC: 101 MMOL/L
CHOLEST SERPL-MCNC: 173 MG/DL
CK SERPL-CCNC: 49 U/L
CO2 SERPL-SCNC: 26 MMOL/L
CREAT SERPL-MCNC: 1.2 MG/DL
EGFR: 60 ML/MIN/1.73M2
GLUCOSE SERPL-MCNC: 95 MG/DL
HDLC SERPL-MCNC: 58 MG/DL
LDLC SERPL CALC-MCNC: 94 MG/DL
LDLC SERPL DIRECT ASSAY-MCNC: 98 MG/DL
NONHDLC SERPL-MCNC: 115 MG/DL
POTASSIUM SERPL-SCNC: 4.3 MMOL/L
PROT SERPL-MCNC: 7.5 G/DL
SODIUM SERPL-SCNC: 137 MMOL/L
TRIGL SERPL-MCNC: 106 MG/DL

## 2023-01-28 ENCOUNTER — APPOINTMENT (OUTPATIENT)
Dept: CARDIOLOGY | Facility: CLINIC | Age: 83
End: 2023-01-28
Payer: MEDICARE

## 2023-01-28 PROCEDURE — 93978 VASCULAR STUDY: CPT

## 2023-05-30 ENCOUNTER — TRANSCRIPTION ENCOUNTER (OUTPATIENT)
Age: 83
End: 2023-05-30

## 2023-07-26 ENCOUNTER — RX RENEWAL (OUTPATIENT)
Age: 83
End: 2023-07-26

## 2023-07-26 RX ORDER — IRBESARTAN 150 MG/1
150 TABLET ORAL
Qty: 90 | Refills: 2 | Status: ACTIVE | COMMUNITY
Start: 2019-06-11 | End: 1900-01-01